# Patient Record
Sex: FEMALE | Race: WHITE | NOT HISPANIC OR LATINO | Employment: FULL TIME | URBAN - METROPOLITAN AREA
[De-identification: names, ages, dates, MRNs, and addresses within clinical notes are randomized per-mention and may not be internally consistent; named-entity substitution may affect disease eponyms.]

---

## 2020-02-16 ENCOUNTER — OFFICE VISIT (OUTPATIENT)
Dept: URGENT CARE | Facility: CLINIC | Age: 25
End: 2020-02-16
Payer: COMMERCIAL

## 2020-02-16 VITALS
OXYGEN SATURATION: 98 % | SYSTOLIC BLOOD PRESSURE: 112 MMHG | TEMPERATURE: 99.1 F | HEIGHT: 61 IN | WEIGHT: 124 LBS | RESPIRATION RATE: 16 BRPM | BODY MASS INDEX: 23.41 KG/M2 | DIASTOLIC BLOOD PRESSURE: 66 MMHG | HEART RATE: 67 BPM

## 2020-02-16 DIAGNOSIS — K12.1 STOMATITIS: Primary | ICD-10-CM

## 2020-02-16 PROCEDURE — 99203 OFFICE O/P NEW LOW 30 MIN: CPT | Performed by: EMERGENCY MEDICINE

## 2020-02-16 RX ORDER — NORETHINDRONE ACETATE AND ETHINYL ESTRADIOL AND FERROUS FUMARATE 1MG-20(21)
KIT ORAL
COMMUNITY
Start: 2020-02-11
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
## 2020-02-16 NOTE — PATIENT INSTRUCTIONS
Oral Mucositis   WHAT YOU NEED TO KNOW:   Oral mucositis is inflammation in and around your mouth  DISCHARGE INSTRUCTIONS:   Follow up with your healthcare provider as directed: You may need to return for more tests if your symptoms do not improve within 2 weeks  Write down your questions so you remember to ask them during your visits  Manage your symptoms:  Oral mucositis usually gets better within 2 to 6 weeks  You can do the following to make your mouth feel better:  · Do not smoke  Smoking can make mouth sores worse  If you smoke, it is never too late to quit  Ask your healthcare provider for information if you need help quitting  · Eat soft, blended, moist foods  Puddings, milkshakes, broths, soups, and cooked cereals are less likely to bother your mouth  Eat food that is lukewarm or cool  · Do not eat anything that could burn, sting, or scratch your mouth  Examples are oranges, pineapples, hot peppers, potato chips, toast, and alcohol  · Take small bites, chew slowly, and sip water  while you eat  Rinse your mouth with water after meals  · Rinse with a baking soda or salt solution  to remove bacteria and food, and to prevent or treat mouth pain and sores  ¨ Mix ½ teaspoon baking soda or salt with 8 ounces of warm water  ¨ Rinse your mouth at least 4 to 6 times a day with this solution  You may rinse with plain water instead if it feels better to you  ¨ Do not use store-bought mouthwash  It contains alcohol and other chemicals that can irritate your mouth  Do not smoke:  Smoking can increase your risk for mouth sores or make them worse  If you smoke, it is never too late to quit  Ask your healthcare provider for information if you need help quitting  Medicines:   · Medicines  can help decrease pain or swelling in your mouth  You may also need medicine to prevent or treat an infection  Medicine may be given as a pill, an ointment, or a mouthwash      · Take your medicine as directed  Contact your healthcare provider if you think your medicine is not helping or if you have side effects  Tell him if you are allergic to any medicine  Keep a list of the medicines, vitamins, and herbs you take  Include the amounts, and when and why you take them  Bring the list or the pill bottles to follow-up visits  Carry your medicine list with you in case of an emergency  Prevent oral mucositis in the future:   · Gently brush your teeth, gums, and tongue  after every meal and before bed  Use a soft toothbrush and plain fluoride toothpaste  Let your toothbrush air dry after each use to prevent bacteria growth  Replace your toothbrush often  Ask if it is safe to floss your teeth  · If you wear dentures, make sure they fit well  Ask your dentist if your dentures can be refitted or if you need new dentures  Be extra careful when you put in or remove dentures  Try to prevent any injuries to your gums that could lead to sores or infection  Wear your dentures during the day only  Soak them in denture solution at night  Ask how to safely clean your gums  · Eat a variety of healthy foods  Examples are fruits, vegetables, whole-grain breads, low-fat dairy products, beans, lean meats, and fish  Heathy foods give your body the vitamins and minerals it needs, and may prevent mucositis  · Avoid any food or drug that triggers mucositis  Some fruit, nuts, shellfish, cinnamon, chewing gum, and toothpaste may trigger mucositis or other symptoms of an allergic reaction  Contact your healthcare provider if:   · You have a fever or chills  · Your symptoms do not get better within 2 weeks  · Your symptoms get worse, even after treatment  · You have questions or concerns about your condition or care  Return to the emergency department if:   · Your heart is beating fast     · You have trouble breathing  · You cannot eat or drink    © 2017 [employer] Information is for End User's use only and may not be sold, redistributed or otherwise used for commercial purposes  All illustrations and images included in CareNotes® are the copyrighted property of [employer]  or Francisco Sharma  The above information is an  only  It is not intended as medical advice for individual conditions or treatments  Talk to your doctor, nurse or pharmacist before following any medical regimen to see if it is safe and effective for you

## 2021-06-29 ENCOUNTER — OFFICE VISIT (OUTPATIENT)
Dept: URGENT CARE | Facility: CLINIC | Age: 26
End: 2021-06-29
Payer: COMMERCIAL

## 2021-06-29 VITALS
SYSTOLIC BLOOD PRESSURE: 110 MMHG | BODY MASS INDEX: 28.32 KG/M2 | HEIGHT: 61 IN | TEMPERATURE: 98.4 F | OXYGEN SATURATION: 98 % | HEART RATE: 75 BPM | RESPIRATION RATE: 16 BRPM | DIASTOLIC BLOOD PRESSURE: 82 MMHG | WEIGHT: 150 LBS

## 2021-06-29 DIAGNOSIS — S13.4XXA WHIPLASH INJURIES, INITIAL ENCOUNTER: Primary | ICD-10-CM
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
## 2021-06-29 PROCEDURE — G0382 LEV 3 HOSP TYPE B ED VISIT: HCPCS | Performed by: PHYSICIAN ASSISTANT

## 2021-06-29 RX ORDER — METHOCARBAMOL 500 MG/1
500 TABLET, FILM COATED ORAL 3 TIMES DAILY
Qty: 20 TABLET | Refills: 0 | Status: SHIPPED | OUTPATIENT
Start: 2021-06-29

## 2021-06-29 RX ORDER — HYDROXYZINE HYDROCHLORIDE 10 MG/1
TABLET, FILM COATED ORAL
COMMUNITY
Start: 2021-06-04

## 2021-06-29 RX ORDER — IBUPROFEN 400 MG/1
800 TABLET ORAL ONCE
Status: COMPLETED | OUTPATIENT
Start: 2021-06-29 | End: 2021-06-29

## 2021-06-29 RX ADMIN — IBUPROFEN 800 MG: 400 TABLET ORAL at 15:30
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
## 2021-06-29 NOTE — PATIENT INSTRUCTIONS
Over-the-counter Tylenol and ibuprofen for pain  Robaxin as needed for muscle spasm  Alternate ice and heat  Gentle range of motion of neck and head  Monitor signs of concussion  Follow-up with family doctor in 3-5 days  Go to ER if symptoms become severe  Concussion   WHAT YOU NEED TO KNOW:   A concussion is a mild brain injury  It is usually caused by a bump or blow to the head from a fall, a motor vehicle crash, or a sports injury  Sometimes being shaken forcefully may cause a concussion  DISCHARGE INSTRUCTIONS:   Have someone call 911 for any of the following:   · Someone tries to wake you and cannot do so  · You have a seizure, increasing confusion, or a change in personality  · Your speech becomes slurred, or you have new vision problems  Return to the emergency department if:   · You have sudden and new vision problems  · You have a severe headache that does not go away  · You have arm or leg weakness, numbness, or new problems with coordination  · You have blood or clear fluid coming out of the ears or nose  Contact your healthcare provider if:   · You have nausea or are vomiting  · You feel more sleepy than usual     · Your symptoms get worse  · Your symptoms last longer than 6 weeks after the injury  · You have questions or concerns about your condition or care  Medicines: You may need any of the following:  · Acetaminophen  decreases pain and fever  It is available without a doctor's order  Ask how much to take and how often to take it  Follow directions  Read the labels of all other medicines you are using to see if they also contain acetaminophen, or ask your doctor or pharmacist  Acetaminophen can cause liver damage if not taken correctly  Do not use more than 4 grams (4,000 milligrams) total of acetaminophen in one day  · NSAIDs  help decrease swelling and pain or fever  This medicine is available with or without a doctor's order   NSAIDs can cause stomach bleeding or kidney problems in certain people  If you take blood thinner medicine, always ask your healthcare provider if NSAIDs are safe for you  Always read the medicine label and follow directions  · Take your medicine as directed  Contact your healthcare provider if you think your medicine is not helping or if you have side effects  Tell him or her if you are allergic to any medicine  Keep a list of the medicines, vitamins, and herbs you take  Include the amounts, and when and why you take them  Bring the list or the pill bottles to follow-up visits  Carry your medicine list with you in case of an emergency  Self-care:  Concussion symptoms usually go away within about 10 days, but they may last longer  The following may be recommended to manage your symptoms:  · Rest from physical and mental activities as directed  Mental activities are those that require thinking, concentration, and attention  You will need to rest until your symptoms are gone  Rest will allow you to recover from your concussion  Ask your healthcare provider when you can return to work and other daily activities  · Have someone stay with you for the first 24 hours after your injury  Your healthcare provider should be contacted if your symptoms get worse, or you develop new symptoms  · Do not participate in sports and physical activities until your healthcare provider says it is okay  They could make your symptoms worse or lead to another concussion  Your healthcare provider will tell you when it is okay for you to return to sports or physical activities  Ask for more information about sports concussions  Prevent another concussion:   · Wear protective sports equipment that fits properly  Helmets help decrease your risk for a serious brain injury  Talk to your healthcare provider about ways you can decrease your risk for a concussion if you play sports  · Wear your seatbelt every time you travel    This helps to decrease your risk for a head injury if you are in a car accident  Follow up with your healthcare provider as directed:  Write down your questions so you remember to ask them during your visits  © Copyright [address] Information is for End User's use only and may not be sold, redistributed or otherwise used for commercial purposes  All illustrations and images included in CareNotes® are the copyrighted property of [employer]  or [facility] Alvares   The above information is an  only  It is not intended as medical advice for individual conditions or treatments  Talk to your doctor, nurse or pharmacist before following any medical regimen to see if it is safe and effective for you  Cervical Spine Strain, Ambulatory Care   GENERAL INFORMATION:   A cervical spine strain  is when the tissues and muscles in your neck are stretched  It is called whiplash because it happens when your neck is quickly whipped forward and back  The pain may be sudden, or it may begin hours after the injury  Cervical spine strain is most commonly caused by a car accident or a contact sports injury  Seek immediate care for the following symptoms:   · Pain, numbness, tingling, or weakness in your arms, face, or scalp    · Shortness of breath, a hoarse voice, or problems swallowing  Treatment for a cervical spine strain  may include any of the following:  · Ibuprofen  decreases pain and swelling  It is available without a doctor's order  Ask how much to take and how often to take it  Follow directions  Ibuprofen can cause stomach bleeding and kidney damage if not taken correctly  · Acetaminophen  decreases pain  It is available without a doctor's order  Ask how much to take and how often to take it  Follow directions  Acetaminophen can cause liver damage if not taken correctly  · Pain medicine  may be prescribed for you  Ask for information on how to take this medicine safely      · Muscle relaxers decrease pain and muscle spasms  Manage your symptoms:   · Wear a soft cervical collar to support your neck and hold it still  You may need to wear this collar for 7 to 10 days  By day 3, your healthcare provider may tell you to take the collar off for short periods of time  He may tell you to wear the collar less each day until you no longer need it  · Rest and avoid moving your neck as your injury heals  Rest will help decrease the risk of more damage to your neck  Gradually return to your normal activities  Stop if you have pain  Avoid activities that can cause more damage to your neck, such as heavy lifting or strenuous exercise  · Ice your neck to help decrease swelling and pain  Put crushed ice in a plastic bag  Cover the ice bag with a towel and place the ice on your neck for 15 to 20 minutes every hour  Do this for as many days as directed  · Sleep without a pillow to help decrease pain  Instead of a pillow, you may roll a small towel tightly and place it under your neck  · Go to physical therapy as directed by your healthcare provider  A physical therapist can teach you exercises to help improve movement and decrease pain  Physical therapy can also help improve strength and decrease your risk for loss of function  Follow up with your healthcare provider as directed:  Write down your questions so you remember to ask them during your visits  CARE AGREEMENT:   You have the right to help plan your care  Learn about your health condition and how it may be treated  Discuss treatment options with your caregivers to decide what care you want to receive  You always have the right to refuse treatment  The above information is an  only  It is not intended as medical advice for individual conditions or treatments  Talk to your doctor, nurse or pharmacist before following any medical regimen to see if it is safe and effective for you    © 2014 [address] is for End User's use only and may not be sold, redistributed or otherwise used for commercial purposes  All illustrations and images included in CareNotes® are the copyrighted property of [employer]  or Francisco Sharma

## 2021-06-29 NOTE — LETTER
June 29, 2021     Patient: Ruy Pastrana   YOB: 1995   Date of Visit: 6/29/2021       To Whom It May Concern: It is my medical opinion that Ruy Pastrana may return to light duty immediately with the following restrictions: Lifting limited to 20 lbs for 2 days  If you have any questions or concerns, please don't hesitate to call           Sincerely,        Laygladys Marshall PA-C